# Patient Record
Sex: FEMALE | Race: WHITE | Employment: STUDENT | URBAN - METROPOLITAN AREA
[De-identification: names, ages, dates, MRNs, and addresses within clinical notes are randomized per-mention and may not be internally consistent; named-entity substitution may affect disease eponyms.]

---
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
## 2020-01-20 ENCOUNTER — OFFICE VISIT (OUTPATIENT)
Dept: OBGYN CLINIC | Facility: CLINIC | Age: 20
End: 2020-01-20
Payer: COMMERCIAL

## 2020-01-20 VITALS
BODY MASS INDEX: 27.64 KG/M2 | WEIGHT: 156 LBS | SYSTOLIC BLOOD PRESSURE: 119 MMHG | DIASTOLIC BLOOD PRESSURE: 72 MMHG | HEIGHT: 63 IN | HEART RATE: 75 BPM

## 2020-01-20 DIAGNOSIS — N89.8 VAGINAL IRRITATION: ICD-10-CM

## 2020-01-20 DIAGNOSIS — Z11.3 SCREENING FOR STD (SEXUALLY TRANSMITTED DISEASE): ICD-10-CM

## 2020-01-20 DIAGNOSIS — Z30.09 ENCOUNTER FOR COUNSELING REGARDING CONTRACEPTION: ICD-10-CM

## 2020-01-20 DIAGNOSIS — Z01.419 WELL WOMAN EXAM WITH ROUTINE GYNECOLOGICAL EXAM: Primary | ICD-10-CM

## 2020-01-20 DIAGNOSIS — N92.6 IRREGULAR PERIODS: ICD-10-CM

## 2020-01-20 PROCEDURE — 99395 PREV VISIT EST AGE 18-39: CPT | Performed by: CLINICAL NURSE SPECIALIST

## 2020-01-20 RX ORDER — ETONOGESTREL AND ETHINYL ESTRADIOL 11.7; 2.7 MG/1; MG/1
INSERT, EXTENDED RELEASE VAGINAL
Qty: 1 RING | Refills: 11 | Status: SHIPPED | OUTPATIENT
Start: 2020-01-20 | End: 2020-11-05

## 2020-01-20 NOTE — PROGRESS NOTES
Hue Lombardo is a 23year old female  Patient's last menstrual period was 2020.  Patient presents with:  Gyn Exam: annual/std testing also near vulvar area felt very sore and itchy but has went away but still wanted to get it check o Not on file      Highest education level: Not on file    Occupational History      Not on file    Social Needs      Financial resource strain: Not on file      Food insecurity:        Worry: Not on file        Inability: Not on file      Transportation nee Etonogestrel-Ethinyl Estradiol (NUVARING) 0.12-0.015 MG/24HR Vaginal Ring, 1 ring per vagina x 21 days and then out for 7, Disp: 1 ring, Rfl: 11  •  busPIRone HCl 5 MG Oral Tab, 10 mg.  , Disp: , Rfl: 0  •  lamoTRIgine 200 MG Oral Tab, Take 200 mg by mouth location, without lesions and prolapse  Bladder:  No fullness, masses or tenderness  Vagina:  Normal appearance without lesions, no abnormal discharge  Cervix:  Normal without tenderness on motion  Uterus: normal in size, contour, position, mobility, witho when to seek medical attention. Reviewed importance of \"change day\" always being the same day of the week, and use of back-up x 1 month. If not happy with ring, can call for Rx for OCP. Monthly self breast exams.    Yearly exams encouraged

## 2020-01-21 LAB
C TRACH DNA SPEC QL NAA+PROBE: NEGATIVE
N GONORRHOEA DNA SPEC QL NAA+PROBE: NEGATIVE

## 2020-01-22 LAB
GENITAL VAGINOSIS SCREEN: NEGATIVE
TRICHOMONAS SCREEN: NEGATIVE

## 2020-01-25 ENCOUNTER — LAB ENCOUNTER (OUTPATIENT)
Dept: LAB | Facility: HOSPITAL | Age: 20
End: 2020-01-25
Attending: CLINICAL NURSE SPECIALIST
Payer: COMMERCIAL

## 2020-01-25 DIAGNOSIS — N92.6 IRREGULAR PERIODS: ICD-10-CM

## 2020-01-25 DIAGNOSIS — Z11.3 SCREENING FOR STD (SEXUALLY TRANSMITTED DISEASE): ICD-10-CM

## 2020-01-25 LAB
HBV SURFACE AG SER-ACNC: 0.15 [IU]/L
HBV SURFACE AG SERPL QL IA: NONREACTIVE
HCG SERPL QL: NEGATIVE
HCV AB SERPL QL IA: NONREACTIVE
PROLACTIN SERPL-MCNC: 14.5 NG/ML
TSI SER-ACNC: 1.13 MIU/ML (ref 0.36–3.74)

## 2020-01-25 PROCEDURE — 36415 COLL VENOUS BLD VENIPUNCTURE: CPT

## 2020-01-25 PROCEDURE — 87340 HEPATITIS B SURFACE AG IA: CPT

## 2020-01-25 PROCEDURE — 86803 HEPATITIS C AB TEST: CPT

## 2020-01-25 PROCEDURE — 84443 ASSAY THYROID STIM HORMONE: CPT

## 2020-01-25 PROCEDURE — 86780 TREPONEMA PALLIDUM: CPT

## 2020-01-25 PROCEDURE — 84146 ASSAY OF PROLACTIN: CPT

## 2020-01-25 PROCEDURE — 84703 CHORIONIC GONADOTROPIN ASSAY: CPT

## 2020-01-25 PROCEDURE — 87389 HIV-1 AG W/HIV-1&-2 AB AG IA: CPT

## 2020-01-27 LAB — T PALLIDUM AB SER QL: NEGATIVE

## 2020-06-14 DIAGNOSIS — Z30.09 ENCOUNTER FOR COUNSELING REGARDING CONTRACEPTION: ICD-10-CM

## 2020-06-15 RX ORDER — ETONOGESTREL AND ETHINYL ESTRADIOL VAGINAL .015; .12 MG/D; MG/D
RING VAGINAL
Qty: 1 RING | Refills: 11 | OUTPATIENT
Start: 2020-06-15

## 2020-06-23 ENCOUNTER — PATIENT MESSAGE (OUTPATIENT)
Dept: PEDIATRICS CLINIC | Facility: CLINIC | Age: 20
End: 2020-06-23

## 2020-06-23 NOTE — TELEPHONE ENCOUNTER
From: Jed Chahal  To: Alberto Tiwari MD  Sent: 6/23/2020 11:33 AM CDT  Subject: Other    Hello,   Hope you are doing well. I was wondering if I could get my immunization records sent to my college?  The email is [email or web address]    Th

## 2020-08-04 ENCOUNTER — TELEPHONE (OUTPATIENT)
Dept: PEDIATRICS CLINIC | Facility: CLINIC | Age: 20
End: 2020-08-04

## 2020-08-04 NOTE — TELEPHONE ENCOUNTER
Page  notified that pt now sees Family Practice now that pt is 21 yrs old. We have not seen pt for 2 yrs. Page  notifying pt/family to call pt's PCP.

## 2020-08-12 NOTE — PATIENT INSTRUCTIONS
Well-Child Checkup: 15 to 25 Years     Stay involved in your teen’s life. Make sure your teen knows you’re always there when he or she needs to talk. During the teen years, it’s important to keep having yearly checkups.  Your teen may be embarrassed a other parts of the body. Girls grow breasts and menstruate (have monthly periods). A boy’s voice changes, becoming lower and deeper. As the penis matures, erections and wet dreams will start to happen.  Talk to your teen about what to expect, and help him o lunch. Not only is this unhealthy, it can also hurt school performance. Make sure your teen eats breakfast. If your teen does not like the food served at school for lunch, allow him or her to prepare a bag lunch.   · Have at least one family meal with you e Recommendations to keep your teen safe include the following:  · Set rules for how your teen can spend time outside of the house. Give your child a nighttime curfew.  If your child has a cell phone, check in periodically by calling to ask where he or she is a result of their changing hormones. It’s also just a part of growing up. But sometimes a teenager’s mood swings are signs of a larger problem. If your teen seems depressed for more than 2 weeks, you should be concerned.  Signs of depression include:  · Use Biopsy Type: H and E

## 2020-09-13 ENCOUNTER — PATIENT MESSAGE (OUTPATIENT)
Dept: FAMILY MEDICINE CLINIC | Facility: CLINIC | Age: 20
End: 2020-09-13

## 2020-09-13 DIAGNOSIS — R41.840 INATTENTION: Primary | ICD-10-CM

## 2020-09-14 NOTE — TELEPHONE ENCOUNTER
From: Jos Chahal  To: Laura Peter MD  Sent: 9/13/2020 12:59 AM CDT  Subject: Other    Hello,     I have some questions in regards to possibly having an attention problem and/or adhd.  I have not been diagnosed before but am realizi

## 2020-09-18 PROBLEM — F90.2 ATTENTION DEFICIT HYPERACTIVITY DISORDER (ADHD), COMBINED TYPE: Status: ACTIVE | Noted: 2020-09-18

## 2020-09-18 PROBLEM — F32.9 REACTIVE DEPRESSION: Status: RESOLVED | Noted: 2018-04-18 | Resolved: 2020-09-18

## 2020-10-25 DIAGNOSIS — Z30.09 ENCOUNTER FOR COUNSELING REGARDING CONTRACEPTION: ICD-10-CM

## 2020-10-25 RX ORDER — ETONOGESTREL AND ETHINYL ESTRADIOL VAGINAL .015; .12 MG/D; MG/D
RING VAGINAL
Qty: 1 RING | Refills: 11 | OUTPATIENT
Start: 2020-10-25

## 2020-10-27 ENCOUNTER — E-VISIT (OUTPATIENT)
Dept: TELEHEALTH | Age: 20
End: 2020-10-27

## 2020-10-27 DIAGNOSIS — J02.9 PHARYNGITIS, UNSPECIFIED ETIOLOGY: Primary | ICD-10-CM

## 2020-10-27 DIAGNOSIS — R09.89 SYMPTOMS OF UPPER RESPIRATORY INFECTION (URI): ICD-10-CM

## 2020-10-27 PROCEDURE — 99422 OL DIG E/M SVC 11-20 MIN: CPT | Performed by: NURSE PRACTITIONER

## 2020-10-27 NOTE — PROGRESS NOTES
Cipriano Ny is a 21year old female. HPI:   See answers to questions above.      Current Outpatient Medications   Medication Sig Dispense Refill   • Amphetamine-Dextroamphet ER 15 MG Oral Capsule SR 24 Hr Take 1 capsule (15 mg total) by mouth e ordered in this encounter       Duration of  the service:  11 minutes    Patient advised to follow up with PCP if no improvement or worsening of symptoms  Refer to MyChart message for specific patient instructions

## 2020-10-27 NOTE — PATIENT INSTRUCTIONS
Self-Care for Sore Throats     Sore throats happen for many reasons, such as colds, allergies, cigarette smoke, air pollution, and infections caused by viruses or bacteria. In any case, your throat becomes red and sore.  Your goal for self-care is to redu · Limit contact with pets and with allergy-causing substances, such as pollen and mold. · Wash your hands often when you’re around someone with a sore throat or cold. This will keep viruses or bacteria from spreading.   · Limit outdoor time when air pollut · If symptoms are severe, rest at home for the first 2 to 3 days. When you resume activity, don't let yourself get too tired. · Don't smoke. If you need help stopping, talk with your healthcare provider.   · Avoid being exposed to cigarette smoke (yours or Willie last reviewed this educational content on 6/1/2018  © 4205-4164 [employer] [address]. [address] [facility], [facility] [place]. All rights reserved. This information is not intended as a substitute for professional medical care.  Always follo * You shared eating or drinking utensils  * They sneezed, coughed, or somehow got respiratory droplets on you    Patients with pending COVID-19 test results should follow all care and home isolation instructions.   Your test results will be called to you fr If you are awaiting test results or are confirmed positive for COVID -19, and your symptoms worsen at home with symptoms such as: extreme weakness, difficult breathing, or unrelenting fevers greater than 100.4 degrees Fahrenheit, you should contact your he Pedro Martinez, in conjunction with Kristine Teresa, is looking for patients who have recovered from COVID-19 and would be interested in donating plasma.     Convalescent plasma is a component of blood that, in people who have recovered from COVID-19, https://www.Nexmo.com/  https://www.cdc.gov/coronavirus/2019-ncov/

## 2020-11-05 ENCOUNTER — TELEPHONE (OUTPATIENT)
Dept: OBGYN CLINIC | Facility: CLINIC | Age: 20
End: 2020-11-05

## 2020-11-05 DIAGNOSIS — Z30.09 ENCOUNTER FOR COUNSELING REGARDING CONTRACEPTION: ICD-10-CM

## 2020-11-05 RX ORDER — ETONOGESTREL AND ETHINYL ESTRADIOL 11.7; 2.7 MG/1; MG/1
INSERT, EXTENDED RELEASE VAGINAL
Qty: 3 RING | Refills: 0 | Status: SHIPPED | OUTPATIENT
Start: 2020-11-05 | End: 2020-11-09

## 2020-11-05 RX ORDER — ETONOGESTREL AND ETHINYL ESTRADIOL VAGINAL .015; .12 MG/D; MG/D
RING VAGINAL
Qty: 1 RING | Refills: 11 | Status: CANCELLED | OUTPATIENT
Start: 2020-11-05

## 2020-11-05 NOTE — TELEPHONE ENCOUNTER
Annual is not until 1/25/21. Would like to know if she can receive a refill on her birth control until then. Please advise.

## 2020-11-05 NOTE — TELEPHONE ENCOUNTER
Advised that we will change rx to reflect 3 rings which will hold her until 1-25-21 annual exam.  Liliam torrez.

## 2020-11-09 ENCOUNTER — PATIENT MESSAGE (OUTPATIENT)
Dept: OBGYN CLINIC | Facility: CLINIC | Age: 20
End: 2020-11-09

## 2020-11-09 DIAGNOSIS — Z30.09 ENCOUNTER FOR COUNSELING REGARDING CONTRACEPTION: ICD-10-CM

## 2020-11-09 RX ORDER — ETONOGESTREL AND ETHINYL ESTRADIOL 11.7; 2.7 MG/1; MG/1
INSERT, EXTENDED RELEASE VAGINAL
Qty: 1 RING | Refills: 0 | Status: SHIPPED | OUTPATIENT
Start: 2020-11-09 | End: 2020-12-30

## 2020-12-16 ENCOUNTER — TELEPHONE (OUTPATIENT)
Dept: FAMILY MEDICINE CLINIC | Facility: CLINIC | Age: 20
End: 2020-12-16

## 2020-12-16 NOTE — TELEPHONE ENCOUNTER
Pt called stating that per Psychiatrist, if possible to place orders for her to get a full blood panel and titters, also check her vitamin D and B.   Pt has an appt on Friday and wants to get this done before the appt  Please call pt back and advise

## 2020-12-18 ENCOUNTER — OFFICE VISIT (OUTPATIENT)
Dept: FAMILY MEDICINE CLINIC | Facility: CLINIC | Age: 20
End: 2020-12-18
Payer: COMMERCIAL

## 2020-12-18 VITALS
DIASTOLIC BLOOD PRESSURE: 64 MMHG | HEIGHT: 62.4 IN | HEART RATE: 102 BPM | WEIGHT: 141 LBS | BODY MASS INDEX: 25.62 KG/M2 | OXYGEN SATURATION: 99 % | SYSTOLIC BLOOD PRESSURE: 110 MMHG

## 2020-12-18 DIAGNOSIS — Z00.00 WELLNESS EXAMINATION: Primary | ICD-10-CM

## 2020-12-18 DIAGNOSIS — R10.84 ABDOMINAL DISCOMFORT, GENERALIZED: ICD-10-CM

## 2020-12-18 DIAGNOSIS — F43.10 PTSD (POST-TRAUMATIC STRESS DISORDER): ICD-10-CM

## 2020-12-18 DIAGNOSIS — Z88.9 MULTIPLE ALLERGIES: ICD-10-CM

## 2020-12-18 DIAGNOSIS — F90.2 ATTENTION DEFICIT HYPERACTIVITY DISORDER (ADHD), COMBINED TYPE: ICD-10-CM

## 2020-12-18 DIAGNOSIS — Z00.00 HEALTHCARE MAINTENANCE: ICD-10-CM

## 2020-12-18 DIAGNOSIS — Z11.3 SCREEN FOR STD (SEXUALLY TRANSMITTED DISEASE): ICD-10-CM

## 2020-12-18 PROCEDURE — 82306 VITAMIN D 25 HYDROXY: CPT | Performed by: FAMILY MEDICINE

## 2020-12-18 PROCEDURE — 87591 N.GONORRHOEAE DNA AMP PROB: CPT | Performed by: FAMILY MEDICINE

## 2020-12-18 PROCEDURE — 3078F DIAST BP <80 MM HG: CPT | Performed by: FAMILY MEDICINE

## 2020-12-18 PROCEDURE — 99395 PREV VISIT EST AGE 18-39: CPT | Performed by: FAMILY MEDICINE

## 2020-12-18 PROCEDURE — 86003 ALLG SPEC IGE CRUDE XTRC EA: CPT | Performed by: FAMILY MEDICINE

## 2020-12-18 PROCEDURE — 3074F SYST BP LT 130 MM HG: CPT | Performed by: FAMILY MEDICINE

## 2020-12-18 PROCEDURE — 86780 TREPONEMA PALLIDUM: CPT | Performed by: FAMILY MEDICINE

## 2020-12-18 PROCEDURE — 99212 OFFICE O/P EST SF 10 MIN: CPT | Performed by: FAMILY MEDICINE

## 2020-12-18 PROCEDURE — 3008F BODY MASS INDEX DOCD: CPT | Performed by: FAMILY MEDICINE

## 2020-12-18 PROCEDURE — 82607 VITAMIN B-12: CPT | Performed by: FAMILY MEDICINE

## 2020-12-18 PROCEDURE — 36415 COLL VENOUS BLD VENIPUNCTURE: CPT | Performed by: FAMILY MEDICINE

## 2020-12-18 PROCEDURE — 80050 GENERAL HEALTH PANEL: CPT | Performed by: FAMILY MEDICINE

## 2020-12-18 PROCEDURE — 80061 LIPID PANEL: CPT | Performed by: FAMILY MEDICINE

## 2020-12-18 PROCEDURE — 87491 CHLMYD TRACH DNA AMP PROBE: CPT | Performed by: FAMILY MEDICINE

## 2020-12-18 PROCEDURE — 87389 HIV-1 AG W/HIV-1&-2 AB AG IA: CPT | Performed by: FAMILY MEDICINE

## 2020-12-18 PROCEDURE — 82785 ASSAY OF IGE: CPT | Performed by: FAMILY MEDICINE

## 2020-12-18 NOTE — PROGRESS NOTES
CHLAM,HIV,T PALLI,ALLERGY,ADULT ALLER,VITAMN B12,VITAMIN D,TSH,LIPID,CMP,and CBC labs drawn per Dr Renée Barnard orders, Patient tolerated lab draw well

## 2020-12-19 NOTE — PROGRESS NOTES
CC: Annual Physical Exam    HPI:   Bhumika Sainz is a 21year old female who presents for a complete physical exam.    HCM  -Diet:  Well-balanced.   -Exercise regularly   Mental health: sees psychiatry; well-controlled    Has multiple allergies a T4   Result Value Ref Range    TSH 1.180 0.358 - 3.740 mIU/mL   VITAMIN B12   Result Value Ref Range    Vitamin B12 521 193 - 986 pg/mL   HIV AG AB COMBO   Result Value Ref Range    HIV Antigen Antibody Combo Non-Reactive Non-Reactive   CBC W/ DIFFERENTIAL Routine infant or child health check 10/24/2013   • Strep throat       Past Surgical History:   Procedure Laterality Date   • ADENOIDECTOMY  2011   • CREATE EARDRUM OPENING,GEN ANESTH     • OTHER SURGICAL HISTORY      wisdon teeth   • TONSILLECTOMY  2011 well-developed and well-nourished. No distress. HENT:   Head: Normocephalic and atraumatic.    Right Ear: Tympanic membrane normal.   Left Ear: Tympanic membrane normal.   Mouth/Throat: Mucous membranes are normal.   Eyes: Pupils are equal, round, and david T PALLIDUM SCREENING CASCADE; Future  -     HIV AG AB COMBO; Future    Multiple allergies  -     ADULT FOOD ALLERGY PROF;  Future  -     ALLERGY REGION 8; Future  -will consider Dr. Ernie Murillo given allergies and pt concern of low immune system    Abdominal d

## 2021-02-08 ENCOUNTER — TELEPHONE (OUTPATIENT)
Dept: OBGYN CLINIC | Facility: CLINIC | Age: 21
End: 2021-02-08

## 2021-02-08 NOTE — TELEPHONE ENCOUNTER
PSR please assist pt with scheduling annual and then send message to address the Nuvaring rx refill. Thanks. Last annual was 1/2020.

## 2021-03-31 ENCOUNTER — TELEPHONE (OUTPATIENT)
Dept: OBGYN CLINIC | Facility: CLINIC | Age: 21
End: 2021-03-31

## 2021-03-31 NOTE — TELEPHONE ENCOUNTER
Patient calling stating she is concerned with her appointment being April 7th and it being the last day of nuva ring and needing to be reinserted she is concerned about the pharmacy filling the order quick enough. no appointments available sooner    Please

## 2021-04-09 ENCOUNTER — IMMUNIZATION (OUTPATIENT)
Dept: LAB | Age: 21
End: 2021-04-09
Attending: HOSPITALIST
Payer: COMMERCIAL

## 2021-04-09 DIAGNOSIS — Z23 NEED FOR VACCINATION: Primary | ICD-10-CM

## 2021-04-09 PROCEDURE — 0001A SARSCOV2 VAC 30MCG/0.3ML IM: CPT

## 2021-04-30 ENCOUNTER — IMMUNIZATION (OUTPATIENT)
Dept: LAB | Age: 21
End: 2021-04-30
Attending: HOSPITALIST
Payer: COMMERCIAL

## 2021-04-30 DIAGNOSIS — Z23 NEED FOR VACCINATION: Primary | ICD-10-CM

## 2021-04-30 PROCEDURE — 0002A SARSCOV2 VAC 30MCG/0.3ML IM: CPT

## 2021-05-06 ENCOUNTER — OFFICE VISIT (OUTPATIENT)
Dept: OBGYN CLINIC | Facility: CLINIC | Age: 21
End: 2021-05-06
Payer: COMMERCIAL

## 2021-05-06 ENCOUNTER — LAB ENCOUNTER (OUTPATIENT)
Dept: LAB | Facility: HOSPITAL | Age: 21
End: 2021-05-06
Attending: CLINICAL NURSE SPECIALIST
Payer: COMMERCIAL

## 2021-05-06 VITALS
DIASTOLIC BLOOD PRESSURE: 80 MMHG | HEART RATE: 118 BPM | WEIGHT: 140 LBS | BODY MASS INDEX: 24.8 KG/M2 | SYSTOLIC BLOOD PRESSURE: 124 MMHG | HEIGHT: 63 IN

## 2021-05-06 DIAGNOSIS — Z01.419 WELL WOMAN EXAM WITH ROUTINE GYNECOLOGICAL EXAM: Primary | ICD-10-CM

## 2021-05-06 DIAGNOSIS — D28.0 VESTIBULAR PAPILLOMATOSIS: ICD-10-CM

## 2021-05-06 DIAGNOSIS — N92.6 IRREGULAR PERIODS: ICD-10-CM

## 2021-05-06 DIAGNOSIS — Z76.0 MEDICATION REFILL: ICD-10-CM

## 2021-05-06 PROCEDURE — 3074F SYST BP LT 130 MM HG: CPT | Performed by: CLINICAL NURSE SPECIALIST

## 2021-05-06 PROCEDURE — 84703 CHORIONIC GONADOTROPIN ASSAY: CPT

## 2021-05-06 PROCEDURE — 36415 COLL VENOUS BLD VENIPUNCTURE: CPT

## 2021-05-06 PROCEDURE — 84146 ASSAY OF PROLACTIN: CPT

## 2021-05-06 PROCEDURE — 99395 PREV VISIT EST AGE 18-39: CPT | Performed by: CLINICAL NURSE SPECIALIST

## 2021-05-06 PROCEDURE — 3008F BODY MASS INDEX DOCD: CPT | Performed by: CLINICAL NURSE SPECIALIST

## 2021-05-06 PROCEDURE — 3079F DIAST BP 80-89 MM HG: CPT | Performed by: CLINICAL NURSE SPECIALIST

## 2021-05-06 RX ORDER — SEGESTERONE ACETATE AND ETHINYL ESTRADIOL 103; 17.4 MG/1; MG/1
1 RING VAGINAL
Qty: 1 EACH | Refills: 13 | Status: SHIPPED | OUTPATIENT
Start: 2021-05-06 | End: 2021-05-19 | Stop reason: ALTCHOICE

## 2021-05-07 NOTE — PROGRESS NOTES
Stephanie Head is a 21year old female  Patient's last menstrual period was 2021 (approximate). Patient presents with:  Gyn Exam: Annual  Medication Request: Ellijay Sanchez rx  Last annual exam was last year. No pap hx d/t age.   Her period disorder) 01/2017   • Reactive depression 4/18/2018   • Routine infant or child health check 10/24/2013   • Strep throat      Past Surgical History:   Procedure Laterality Date   • ADENOIDECTOMY  2011   • CREATE EARDRUM OPENING,GEN ANESTH     • OTHER [employer] [place] [employer] or Organization Meetings:       Marital Status:   Intimate Partner Violence:       Fear of Current or Ex-Partner:       Emotionally Abused:       Physically Abused:       Sexually Abused:     FAMILY HISTORY:  Family History   Problem Relation Age of O SUICIDALITY      Review of Systems:  Constitutional:  Denies fatigue, night sweats, hot flashes  Eyes:  denies blurred or double vision  Cardiovascular:  denies chest pain or palpitations  Respiratory:  denies shortness of breath  Gastrointestinal:  denies without tenderness  Adnexa: normal without masses or tenderness  Perineum: normal  Anus: no hemorroids     Assessment & Plan:  Roslyn Bosworth was seen today for gyn exam and medication request.    Diagnoses and all orders for this visit:    Well woman exam with ro

## 2022-05-11 ENCOUNTER — LAB ENCOUNTER (OUTPATIENT)
Dept: LAB | Facility: HOSPITAL | Age: 22
End: 2022-05-11
Attending: FAMILY MEDICINE
Payer: COMMERCIAL

## 2022-05-11 ENCOUNTER — OFFICE VISIT (OUTPATIENT)
Dept: INTERNAL MEDICINE CLINIC | Facility: CLINIC | Age: 22
End: 2022-05-11
Payer: COMMERCIAL

## 2022-05-11 VITALS
DIASTOLIC BLOOD PRESSURE: 60 MMHG | HEART RATE: 71 BPM | HEIGHT: 62.4 IN | SYSTOLIC BLOOD PRESSURE: 118 MMHG | WEIGHT: 136 LBS | TEMPERATURE: 98 F | BODY MASS INDEX: 24.71 KG/M2 | OXYGEN SATURATION: 95 %

## 2022-05-11 DIAGNOSIS — Z23 NEED FOR VACCINATION: ICD-10-CM

## 2022-05-11 DIAGNOSIS — G43.709 CHRONIC MIGRAINE WITHOUT AURA WITHOUT STATUS MIGRAINOSUS, NOT INTRACTABLE: ICD-10-CM

## 2022-05-11 DIAGNOSIS — Z11.3 SCREEN FOR STD (SEXUALLY TRANSMITTED DISEASE): ICD-10-CM

## 2022-05-11 DIAGNOSIS — Z00.00 WELLNESS EXAMINATION: ICD-10-CM

## 2022-05-11 DIAGNOSIS — L70.0 ACNE VULGARIS: ICD-10-CM

## 2022-05-11 DIAGNOSIS — Z00.00 WELLNESS EXAMINATION: Primary | ICD-10-CM

## 2022-05-11 LAB
ALBUMIN SERPL-MCNC: 4 G/DL (ref 3.4–5)
ALBUMIN/GLOB SERPL: 1.4 {RATIO} (ref 1–2)
ALP LIVER SERPL-CCNC: 77 U/L
ALT SERPL-CCNC: 14 U/L
ANION GAP SERPL CALC-SCNC: 5 MMOL/L (ref 0–18)
AST SERPL-CCNC: 15 U/L (ref 15–37)
BASOPHILS # BLD AUTO: 0.05 X10(3) UL (ref 0–0.2)
BASOPHILS NFR BLD AUTO: 0.7 %
BILIRUB SERPL-MCNC: 0.2 MG/DL (ref 0.1–2)
BUN BLD-MCNC: 13 MG/DL (ref 7–18)
BUN/CREAT SERPL: 16 (ref 10–20)
CALCIUM BLD-MCNC: 9 MG/DL (ref 8.5–10.1)
CHLORIDE SERPL-SCNC: 108 MMOL/L (ref 98–112)
CHOLEST SERPL-MCNC: 150 MG/DL (ref ?–200)
CO2 SERPL-SCNC: 28 MMOL/L (ref 21–32)
CREAT BLD-MCNC: 0.81 MG/DL
DEPRECATED RDW RBC AUTO: 41.1 FL (ref 35.1–46.3)
EOSINOPHIL # BLD AUTO: 0.19 X10(3) UL (ref 0–0.7)
EOSINOPHIL NFR BLD AUTO: 2.7 %
ERYTHROCYTE [DISTWIDTH] IN BLOOD BY AUTOMATED COUNT: 13 % (ref 11–15)
FASTING PATIENT LIPID ANSWER: YES
FASTING STATUS PATIENT QL REPORTED: YES
GLOBULIN PLAS-MCNC: 2.9 G/DL (ref 2.8–4.4)
GLUCOSE BLD-MCNC: 89 MG/DL (ref 70–99)
HCT VFR BLD AUTO: 39.1 %
HDLC SERPL-MCNC: 66 MG/DL (ref 40–59)
HGB BLD-MCNC: 11.8 G/DL
IMM GRANULOCYTES # BLD AUTO: 0.01 X10(3) UL (ref 0–1)
IMM GRANULOCYTES NFR BLD: 0.1 %
LDLC SERPL CALC-MCNC: 73 MG/DL (ref ?–100)
LYMPHOCYTES # BLD AUTO: 3.32 X10(3) UL (ref 1–4)
LYMPHOCYTES NFR BLD AUTO: 47.5 %
MCH RBC QN AUTO: 26.3 PG (ref 26–34)
MCHC RBC AUTO-ENTMCNC: 30.2 G/DL (ref 31–37)
MCV RBC AUTO: 87.1 FL
MONOCYTES # BLD AUTO: 0.48 X10(3) UL (ref 0.1–1)
MONOCYTES NFR BLD AUTO: 6.9 %
NEUTROPHILS # BLD AUTO: 2.94 X10 (3) UL (ref 1.5–7.7)
NEUTROPHILS # BLD AUTO: 2.94 X10(3) UL (ref 1.5–7.7)
NEUTROPHILS NFR BLD AUTO: 42.1 %
NONHDLC SERPL-MCNC: 84 MG/DL (ref ?–130)
OSMOLALITY SERPL CALC.SUM OF ELEC: 292 MOSM/KG (ref 275–295)
PLATELET # BLD AUTO: 299 10(3)UL (ref 150–450)
POTASSIUM SERPL-SCNC: 3.9 MMOL/L (ref 3.5–5.1)
PROT SERPL-MCNC: 6.9 G/DL (ref 6.4–8.2)
RBC # BLD AUTO: 4.49 X10(6)UL
SODIUM SERPL-SCNC: 141 MMOL/L (ref 136–145)
TRIGL SERPL-MCNC: 48 MG/DL (ref 30–149)
VLDLC SERPL CALC-MCNC: 7 MG/DL (ref 0–30)
WBC # BLD AUTO: 7 X10(3) UL (ref 4–11)

## 2022-05-11 PROCEDURE — 80061 LIPID PANEL: CPT

## 2022-05-11 PROCEDURE — 87591 N.GONORRHOEAE DNA AMP PROB: CPT

## 2022-05-11 PROCEDURE — 85025 COMPLETE CBC W/AUTO DIFF WBC: CPT | Performed by: FAMILY MEDICINE

## 2022-05-11 PROCEDURE — 80053 COMPREHEN METABOLIC PANEL: CPT

## 2022-05-11 PROCEDURE — 99395 PREV VISIT EST AGE 18-39: CPT | Performed by: FAMILY MEDICINE

## 2022-05-11 PROCEDURE — 36415 COLL VENOUS BLD VENIPUNCTURE: CPT | Performed by: FAMILY MEDICINE

## 2022-05-11 PROCEDURE — 87491 CHLMYD TRACH DNA AMP PROBE: CPT

## 2022-05-11 PROCEDURE — 86780 TREPONEMA PALLIDUM: CPT

## 2022-05-11 PROCEDURE — 3074F SYST BP LT 130 MM HG: CPT | Performed by: FAMILY MEDICINE

## 2022-05-11 PROCEDURE — 99212 OFFICE O/P EST SF 10 MIN: CPT | Performed by: FAMILY MEDICINE

## 2022-05-11 PROCEDURE — 3078F DIAST BP <80 MM HG: CPT | Performed by: FAMILY MEDICINE

## 2022-05-11 PROCEDURE — 3008F BODY MASS INDEX DOCD: CPT | Performed by: FAMILY MEDICINE

## 2022-05-11 PROCEDURE — 90471 IMMUNIZATION ADMIN: CPT | Performed by: FAMILY MEDICINE

## 2022-05-11 PROCEDURE — 87389 HIV-1 AG W/HIV-1&-2 AB AG IA: CPT

## 2022-05-11 PROCEDURE — 90715 TDAP VACCINE 7 YRS/> IM: CPT | Performed by: FAMILY MEDICINE

## 2022-05-11 RX ORDER — SUMATRIPTAN 50 MG/1
50 TABLET, FILM COATED ORAL EVERY 2 HOUR PRN
Qty: 15 TABLET | Refills: 0 | Status: SHIPPED | OUTPATIENT
Start: 2022-05-11

## 2022-05-11 RX ORDER — ONDANSETRON 4 MG/1
4 TABLET, FILM COATED ORAL EVERY 8 HOURS PRN
Qty: 20 TABLET | Refills: 0 | Status: SHIPPED | OUTPATIENT
Start: 2022-05-11

## 2022-05-12 LAB
C TRACH DNA SPEC QL NAA+PROBE: NEGATIVE
N GONORRHOEA DNA SPEC QL NAA+PROBE: NEGATIVE

## 2022-05-13 LAB — T PALLIDUM AB SER QL: NEGATIVE

## 2022-08-24 ENCOUNTER — TELEPHONE (OUTPATIENT)
Dept: INTERNAL MEDICINE CLINIC | Facility: CLINIC | Age: 22
End: 2022-08-24

## 2022-08-24 DIAGNOSIS — L70.0 ACNE VULGARIS: Primary | ICD-10-CM

## 2022-08-24 NOTE — TELEPHONE ENCOUNTER
Pt is calling stating that last visit pcp had mention that can give her antibiotics to help with acne but pt wasn't sure and decide to get referral to see specialist. Pt wants to know if doctor is able to prescribe the antibiotics for the acne since when she called to make an appointment she was notified that   is not longer in that office.        Please call and advise

## 2022-08-26 RX ORDER — MINOCYCLINE HYDROCHLORIDE 100 MG/1
100 CAPSULE ORAL 2 TIMES DAILY
Qty: 60 CAPSULE | Refills: 0 | Status: SHIPPED | OUTPATIENT
Start: 2022-08-26

## 2022-08-26 NOTE — TELEPHONE ENCOUNTER
Spoke to patient. She declines creams/gels as it has never worked for her in the past.  She is requesting an oral antibiotic (one she has had in the past). Would like a female derm. Ok per Dr. Cari Cruz to send oral abx previously used (minocycline). Pt aware that medication will be sent on a monthly basis and no more than 3 months will be provided. She is to set appt with derm.

## 2022-09-15 ENCOUNTER — LAB ENCOUNTER (OUTPATIENT)
Dept: LAB | Facility: HOSPITAL | Age: 22
End: 2022-09-15
Attending: OBSTETRICS & GYNECOLOGY
Payer: COMMERCIAL

## 2022-09-15 ENCOUNTER — OFFICE VISIT (OUTPATIENT)
Dept: OBGYN CLINIC | Facility: CLINIC | Age: 22
End: 2022-09-15
Payer: COMMERCIAL

## 2022-09-15 VITALS
BODY MASS INDEX: 24.28 KG/M2 | SYSTOLIC BLOOD PRESSURE: 118 MMHG | HEIGHT: 62.4 IN | DIASTOLIC BLOOD PRESSURE: 68 MMHG | WEIGHT: 133.63 LBS

## 2022-09-15 DIAGNOSIS — E28.2 PCOS (POLYCYSTIC OVARIAN SYNDROME): ICD-10-CM

## 2022-09-15 DIAGNOSIS — Z01.419 ENCOUNTER FOR ANNUAL ROUTINE GYNECOLOGICAL EXAMINATION: Primary | ICD-10-CM

## 2022-09-15 LAB
DHEA-S SERPL-MCNC: 340.1 UG/DL
FSH SERPL-ACNC: 7.2 MIU/ML
PROLACTIN SERPL-MCNC: 11.6 NG/ML
TSI SER-ACNC: 1.36 MIU/ML (ref 0.36–3.74)

## 2022-09-15 PROCEDURE — 84402 ASSAY OF FREE TESTOSTERONE: CPT

## 2022-09-15 PROCEDURE — 36415 COLL VENOUS BLD VENIPUNCTURE: CPT

## 2022-09-15 PROCEDURE — 3078F DIAST BP <80 MM HG: CPT | Performed by: OBSTETRICS & GYNECOLOGY

## 2022-09-15 PROCEDURE — 84146 ASSAY OF PROLACTIN: CPT

## 2022-09-15 PROCEDURE — 83001 ASSAY OF GONADOTROPIN (FSH): CPT

## 2022-09-15 PROCEDURE — 3074F SYST BP LT 130 MM HG: CPT | Performed by: OBSTETRICS & GYNECOLOGY

## 2022-09-15 PROCEDURE — 99385 PREV VISIT NEW AGE 18-39: CPT | Performed by: OBSTETRICS & GYNECOLOGY

## 2022-09-15 PROCEDURE — 82627 DEHYDROEPIANDROSTERONE: CPT

## 2022-09-15 PROCEDURE — 84403 ASSAY OF TOTAL TESTOSTERONE: CPT

## 2022-09-15 PROCEDURE — 84443 ASSAY THYROID STIM HORMONE: CPT

## 2022-09-15 PROCEDURE — 3008F BODY MASS INDEX DOCD: CPT | Performed by: OBSTETRICS & GYNECOLOGY

## 2022-09-24 LAB
TESTOSTERONE, FREE, S: 1.11 NG/DL
TESTOSTERONE, TOTAL, S: 95 NG/DL

## 2022-11-04 PROBLEM — F43.12 NIGHTMARES ASSOCIATED WITH CHRONIC POST-TRAUMATIC STRESS DISORDER: Status: ACTIVE | Noted: 2022-11-04

## 2022-11-04 PROBLEM — F51.5 NIGHTMARES ASSOCIATED WITH CHRONIC POST-TRAUMATIC STRESS DISORDER: Status: ACTIVE | Noted: 2022-11-04

## 2023-03-13 DIAGNOSIS — G43.709 CHRONIC MIGRAINE WITHOUT AURA WITHOUT STATUS MIGRAINOSUS, NOT INTRACTABLE: ICD-10-CM

## 2023-03-14 RX ORDER — SUMATRIPTAN 50 MG/1
50 TABLET, FILM COATED ORAL EVERY 2 HOUR PRN
Qty: 15 TABLET | Refills: 0 | Status: SHIPPED | OUTPATIENT
Start: 2023-03-14

## 2023-10-10 PROBLEM — G44.009 MIGRAINE-CLUSTER HEADACHE SYNDROME: Status: ACTIVE | Noted: 2023-07-27

## 2023-10-10 PROBLEM — N92.6 IRREGULAR MENSTRUAL CYCLE: Status: ACTIVE | Noted: 2023-07-27

## 2023-10-10 PROBLEM — E28.2 PCOS (POLYCYSTIC OVARIAN SYNDROME): Status: ACTIVE | Noted: 2023-08-29

## (undated) NOTE — LETTER
Date: 12/5/2019    Patient Name: Kesha Chahal      To Whom it may concern: This letter has been written at the patient's request. The above patient was seen at the [facility] for treatment of a medical condition.     This patient

## (undated) NOTE — LETTER
2018              Tung Chahal  2000        [id number]  [place]         To Whom It May Concern,    Please be advised that Melody Traore is a patient of mine.  She had her Hepatitis B antigen drawn

## (undated) NOTE — LETTER
4/18/2018              Raymundo Yu [address]         Immunization History   Administered Date(s) Administered   • DTAP 09/27/2000, 11/13/2000, 03/19/2001, 02/06/2002, 06/13/2005   • HEP A 06/13/2005, 06/06/2014

## (undated) NOTE — LETTER
VACCINE ADMINISTRATION RECORD  PARENT / GUARDIAN APPROVAL  Date: 2018  Vaccine administered to: Olivia Christopher     : 2000    MRN: [id number]    A copy of the appropriate Centers for Disease Control and Prevention Vaccine Information statement h

## (undated) NOTE — LETTER
5/15/2018              Raymundo Chahal        [id number]  [place]         Immunization History   Administered Date(s) Administered   • DTAP 09/27/2000, 11/13/2000, 03/19/2001, 02/06/2002, 06/13/2005   •

## (undated) NOTE — LETTER
2020              Kesha Chahal                                                                                : [birth year]        [address] [facility] [address]       Immunization History   Administered Date(s) Administered   •

## (undated) NOTE — LETTER
[facility] [employer] of Child Health Examination       Student's Name  Landen Coats Birth Date Title                           Date     Signature HEALTH HISTORY          TO BE COMPLETED AND SIGNED BY PARENT/GUARDIAN AND VERIFIED BY HEALTH CARE PROVIDER    ALLERGIES  (Food, drug, insect, other)  Patient has no known allergies.  MEDICATION  (List all prescribed or taken on a regular basis.)    Current When?  What for? Yes   No    Diabetes? Yes   No  Serious injury or illness? Yes   No    Head Injury/Concussion/Passed out? Yes   No  TB skin text positive (past/present)? Yes   No *If yes, refer to local    Seizures? What are they like?    Yes TB Skin OR Blood Test   Rec.only for children in high-risk groups incl. children immunosuppressed due to HIV infection or other conditions, frequent travel to or born in high prevalence countries or those exposed to adults in high-risk categories.   See CDC health professional, check title:  __Nurse  __Teacher  __Counselor  __Principal   EMERGENCY ACTION  needed while at school due to child's health condition (e.g., seizures, asthma, insect sting, food, peanut allergy, bleeding problem, diabetes, heart proble

## (undated) NOTE — LETTER
VACCINE ADMINISTRATION RECORD  PARENT / GUARDIAN APPROVAL  Date: 2018  Vaccine administered to: Deisy Franklin     : 2000    MRN: [id number]    A copy of the appropriate Centers for Disease Control and Prevention Vaccine Information statement h